# Patient Record
Sex: FEMALE | Race: WHITE | Employment: FULL TIME | URBAN - METROPOLITAN AREA
[De-identification: names, ages, dates, MRNs, and addresses within clinical notes are randomized per-mention and may not be internally consistent; named-entity substitution may affect disease eponyms.]

---

## 2018-06-25 ENCOUNTER — HOSPITAL ENCOUNTER (OUTPATIENT)
Dept: PHYSICAL THERAPY | Age: 63
Discharge: HOME | End: 2018-06-25
Payer: COMMERCIAL

## 2018-06-25 PROCEDURE — 97162 PT EVAL MOD COMPLEX 30 MIN: CPT

## 2018-06-25 PROCEDURE — 97140 MANUAL THERAPY 1/> REGIONS: CPT

## 2018-06-25 NOTE — PROGRESS NOTES
[facility] PHYSICAL THERAPY  [address] [place] [address] 51, [facility] 201,[facility], [facility] - Phone: [phone]  Fax: [phone] / [address] [facility]  Patient Name: Zach Kam : 1955   Medical   Diagnosis: Right shoulder pain [M25.511] Treatment Diagnosis: Right shoulder pain [M25.511]   Onset Date: ~3wks ago     Referral Source: Tank Laureano MD [facility]): 2018   Prior Hospitalization: See medical history Provider #: [id number]   Prior Level of Function: Pain free reaching    Comorbidities: Osteoporosis   Medications: Verified on Patient Summary List   The Plan of Care and following information is based on the information from the initial evaluation.   ===========================================================================================  Assessment / aleman information:  Zach Kam is a 60 y.o.  yo female with Dx of Right shoulder pain [M25.511]. She reports insidious onset of loss of ROM in her R shoulder ~3wks ago. She currently rates her pain as 8/10 at worst, 0/10 at best, primarily located at anterior aspect of her R shoulder. She complains of difficulty and increase pain with reaching up and reaching behind. Objective Findings:  Shoulder AROM: Flx: R = 137 deg, L = 167 deg, Scaption: R = 138 deg, L = 167 deg,   Ext: R = 47 deg, L = 64 deg,  ER: R = 38 deg, L = 74 deg,  IR: R = 38 cm measured from C7 to thumb behind the back, L = 15 cm measured from C7 to thumb behind the back, Manual Muscle Testing: All UE strength are WNL. Palpation: increased soft tissue tension noted at R post cuff and pec.   Pt instructed in HEP and will f/u in clinic for PT.  ===========================================================================================  Eval Complexity: History MEDIUM  Complexity : 1-2 comorbidities / personal factors will impact the outcome/ POC ;  Examination  MEDIUM Complexity : 3 Standardized tests and measures addressing body structure, function, activity limitation and / or participation in recreation ; Presentation MEDIUM Complexity : Evolving with changing characteristics ; Decision Making MEDIUM Complexity : FOTO score of 26-74; Overall Complexity MEDIUM  Problem List: pain affecting function, decrease ROM, decrease ADL/ functional abilitiies, decrease activity tolerance and decrease flexibility/ joint mobility   Treatment Plan may include any combination of the following: Therapeutic exercise, Therapeutic activities, Neuromuscular re-education, Physical agent/modality, Manual therapy, Patient education, Self Care training and Functional mobility training  Patient / Family readiness to learn indicated by: asking questions, trying to perform skills and interest  Persons(s) to be included in education: patient (P)  Barriers to Learning/Limitations: no  Measures taken: FOTO = 57%   Patient Goal (s): Decrease pain    Patient self reported health status: excellent  Rehabilitation Potential: good   Short Term Goals: To be accomplished in  1-2  weeks:  1. Independent with HEP. 2. Decrease max pain 25-50% to assist with reaching behind   Long Term Goals: To be accomplished in  3-4  weeks:  1. Decrease max pain 50-75% to assist with reaching behind  2. Increase FOTO score to 69% to show functional improvment  3. Increaese R IR behind the back ROM to <25cm to assist with ADLs. Frequency / Duration:   Patient to be seen  2-3  times per week for 3-4  weeks:  Patient / Caregiver education and instruction: self care and exercises    Therapist Signature: Ulises Woo DPT, OCS, SCS, CSCS Date: 9/29/3170   Certification Period: na Time: 10:50 AM   =================================================================================I certify that the above Physical Therapy Services are being furnished while the patient is under my care.   I agree with the treatment plan and certify that this therapy is necessary. Physician Signature:        Date:       Time:     Please sign and return to [facility] or you may fax the signed copy to [phone]. Thank you. no difficulties

## 2018-06-25 NOTE — PROGRESS NOTES
PHYSICAL THERAPY - DAILY TREATMENT NOTE    Patient Name: Kalie Foster        Date: 2018  : 1955   YES Patient  Verified  Visit #:     Insurance: Payor: Carmen Severance / Plan: [facility] PPO / Product Type: PPO /      In time: 10:25 Out time: 10:55   Total Treatment Time: 35     Medicare Time Tracking (below)   Total Timed Codes (min):  na 1:1 Treatment Time:  na     TREATMENT AREA =  Right shoulder pain [M25.511]    SUBJECTIVE  Pain Level (on 0 to 10 scale):    Medication Changes/New allergies or changes in medical history, any new surgeries or procedures? NO    If yes, update Summary List   Subjective Functional Status/Changes:  []  No changes reported     SEE IE          OBJECTIVE    10 min Manual Therapy: DTM Post Cuff, Pec, GH Mob, PROM/Stretch   Rationale:      decrease pain, increase ROM and increase tissue extensibility to improve patient's ability to reach     min Patient Education:  YES  Reviewed HEP   []  Progressed/Changed HEP based on: Other Objective/Functional Measures:    SEE IE     Post Treatment Pain Level (on 0 to 10) scale:       ASSESSMENT  Assessment/Changes in Function:     SEE IE     []  See Progress Note/Recertification   Patient will continue to benefit from skilled PT services to modify and progress therapeutic interventions, address functional mobility deficits, address ROM deficits, address strength deficits, analyze and address soft tissue restrictions, analyze and cue movement patterns, analyze and modify body mechanics/ergonomics and assess and modify postural abnormalities to attain remaining goals. Progress toward goals / Updated goals:         PLAN  []  Upgrade activities as tolerated YES Continue plan of care   []  Discharge due to :    []  Other:      Therapist: Daisha Lawson, PT, OCS, SCS, CSCS    Date: 2018 Time: 10:49 AM       No future appointments.

## 2018-06-27 ENCOUNTER — HOSPITAL ENCOUNTER (OUTPATIENT)
Dept: PHYSICAL THERAPY | Age: 63
Discharge: HOME | End: 2018-06-27
Payer: COMMERCIAL

## 2018-06-27 PROCEDURE — 97140 MANUAL THERAPY 1/> REGIONS: CPT

## 2018-06-27 PROCEDURE — 97110 THERAPEUTIC EXERCISES: CPT

## 2018-06-27 NOTE — PROGRESS NOTES
PHYSICAL THERAPY - DAILY TREATMENT NOTE    Patient Name: Beatriz Hughes        Date: 2018  : 1955   YES Patient  Verified  Visit #:   2   of   12  Insurance: Payor: BLUE CROSS / Plan: [facility] PPO / Product Type: PPO /      In time: 305 Out time: 345   Total Treatment Time: 40     Medicare Time Tracking (below)   Total Timed Codes (min):  30 1:1 Treatment Time:  30     TREATMENT AREA =  Right shoulder pain [M25.511]    SUBJECTIVE  Pain Level (on 0 to 10 scale):  0  / 10   Medication Changes/New allergies or changes in medical history, any new surgeries or procedures? NO    If yes, update Summary List   Subjective Functional Status/Changes:  []  No changes reported     Pt is reporting pain with certain movements - behind the back and reaching out to the side. OBJECTIVE  Modalities Rationale:     decrease inflammation and decrease pain to improve patient's ability to perform pain free ADLs. min [] Estim, type/location:                                      []  att     []  unatt     []  w/US     []  w/ice    []  w/heat    min []  Mechanical Traction: type/lbs                   []  pro   []  sup   []  int   []  cont    []  before manual    []  after manual    min []  Ultrasound, settings/location:      min []  Iontophoresis w/ dexamethasone, location:                                               []  take home patch       []  in clinic   10 min [x]  Ice     []  Heat    location/position: Supine, (R) shoulder. min []  Vasopneumatic Device, press/temp:     min []  Other:    [x] Skin assessment post-treatment (if applicable):    [x]  intact    []  redness- no adverse reaction     []redness  adverse reaction:        20 min Therapeutic Exercise:  [x]  See flow sheet   Rationale:      increase ROM, increase strength and improve coordination to improve the patients ability to perform pain free ADLs. 10 Min Manual Therapy: (R) shoulder PROM.   TPR (R) rhomboids, periscapular mms.    Rationale:      decrease pain, increase ROM, increase tissue extensibility and decrease trigger points to improve patient's ability to perform pain free ADLs. min Patient Education:  YES  Reviewed HEP   []  Progressed/Changed HEP based on: Other Objective/Functional Measures: Added multiple exercises to improve (R) shoulder mobility/strength for ADLs. Post Treatment Pain Level (on 0 to 10) scale:   0   / 10     ASSESSMENT  Assessment/Changes in Function:     Updated HEP exercises and instructed pt to perform 2x daily. []  See Progress Note/Recertification   Patient will continue to benefit from skilled PT services to modify and progress therapeutic interventions, address functional mobility deficits, address ROM deficits, address strength deficits, analyze and address soft tissue restrictions, analyze and cue movement patterns, analyze and modify body mechanics/ergonomics and assess and modify postural abnormalities to attain remaining goals. Progress toward goals / Updated goals:    Initiated therex.       PLAN  [x]  Upgrade activities as tolerated YES Continue plan of care   []  Discharge due to :    []  Other:      Therapist: Howie Sherman PTA    Date: 6/27/2018 Time: 3:08 PM     Future Appointments  Date Time Provider Dirk Paz   7/2/2018 4:30 PM Howie Sherman PTA [facility]   7/5/2018 4:00  [facility]   7/10/2018 4:00  [facility]   7/12/2018 4:00  [facility]   7/17/2018 4:00 PM Barbara Lemus PT [facility]   7/19/2018 10:00 AM Gustavo Alas, PT [id number]

## 2018-07-02 ENCOUNTER — HOSPITAL ENCOUNTER (OUTPATIENT)
Dept: PHYSICAL THERAPY | Age: 63
Discharge: HOME | End: 2018-07-02
Payer: COMMERCIAL

## 2018-07-02 PROCEDURE — 97140 MANUAL THERAPY 1/> REGIONS: CPT

## 2018-07-02 PROCEDURE — 97110 THERAPEUTIC EXERCISES: CPT

## 2018-07-02 NOTE — PROGRESS NOTES
PHYSICAL THERAPY - DAILY TREATMENT NOTE    Patient Name: Pao Strickland        Date: 2018  : 1955   YES Patient  Verified  Visit #:   3   of   12  Insurance: Payor: Adry Cano / Plan: [facility] PPO / Product Type: PPO /      In time: 440 Out time: 525   Total Treatment Time: 45     Medicare Time Tracking (below)   Total Timed Codes (min):  35 1:1 Treatment Time:  25     TREATMENT AREA =  Right shoulder pain [M25.511]    SUBJECTIVE  Pain Level (on 0 to 10 scale):  0  / 10   Medication Changes/New allergies or changes in medical history, any new surgeries or procedures? NO    If yes, update Summary List   Subjective Functional Status/Changes:  []  No changes reported     No new complaints. OBJECTIVE  Modalities Rationale:     decrease inflammation and decrease pain  to improve patient's ability to perform pain free ADLs. min [] Estim, type/location:                                      []  att     []  unatt     []  w/US     []  w/ice    []  w/heat    min []  Mechanical Traction: type/lbs                   []  pro   []  sup   []  int   []  cont    []  before manual    []  after manual    min []  Ultrasound, settings/location:      min []  Iontophoresis w/ dexamethasone, location:                                               []  take home patch       []  in clinic   10 min [x]  Ice     []  Heat    location/position: Supine, (R) shoulder. min []  Vasopneumatic Device, press/temp:     min []  Other:    [x] Skin assessment post-treatment (if applicable):    [x]  intact    []  redness- no adverse reaction     []redness  adverse reaction:        20 (10) min Therapeutic Exercise:  [x]  See flow sheet   Rationale:      increase ROM, increase strength and improve coordination to improve the patients ability to perform pain free ADLs. 15 Min Manual Therapy: (R) shoulder PROM. TPR (R) periscapular mms.     Rationale:      decrease pain, increase ROM, increase tissue extensibility and decrease trigger points to improve patient's ability to perform pain free ADLs. min Patient Education:  YES  Reviewed HEP   []  Progressed/Changed HEP based on: Other Objective/Functional Measures: Therex per flow sheet. Post Treatment Pain Level (on 0 to 10) scale:   0   / 10     ASSESSMENT  Assessment/Changes in Function:     Soreness in posterior cuff and pec minor. Range limited by pain. []  See Progress Note/Recertification   Patient will continue to benefit from skilled PT services to modify and progress therapeutic interventions, address functional mobility deficits, address ROM deficits, address strength deficits, analyze and address soft tissue restrictions, analyze and cue movement patterns, analyze and modify body mechanics/ergonomics and assess and modify postural abnormalities to attain remaining goals. Progress toward goals / Updated goals:    No change in progress toward LTG's with today's session.       PLAN  [x]  Upgrade activities as tolerated YES Continue plan of care   []  Discharge due to :    []  Other:      Therapist: Howie Sherman PTA    Date: 7/2/2018 Time: 4:58 PM     Future Appointments  Date Time Provider Dirk Paz   7/5/2018 2:00 PM Blanca Delgado, PT [facility]   7/10/2018 4:00 PM [facility]   7/12/2018 4:00 PM [facility]   7/17/2018 4:00 PM Barbara Lemus, PT [facility]   7/19/2018 10:00 AM Gustavo Alas, PT [facility]

## 2018-07-05 ENCOUNTER — HOSPITAL ENCOUNTER (OUTPATIENT)
Dept: PHYSICAL THERAPY | Age: 63
End: 2018-07-05
Payer: COMMERCIAL

## 2018-07-10 ENCOUNTER — HOSPITAL ENCOUNTER (OUTPATIENT)
Dept: PHYSICAL THERAPY | Age: 63
Discharge: HOME | End: 2018-07-10
Payer: COMMERCIAL

## 2018-07-10 PROCEDURE — 97110 THERAPEUTIC EXERCISES: CPT

## 2018-07-10 PROCEDURE — 97140 MANUAL THERAPY 1/> REGIONS: CPT

## 2018-07-10 NOTE — PROGRESS NOTES
PHYSICAL THERAPY - DAILY TREATMENT NOTE    Patient Name: Americo Marquez        Date: 7/10/2018  : 1955   YES Patient  Verified  Visit #:     Insurance: Payor: Ashley Jones / Plan: [facility] PPO / Product Type: PPO /      In time: 3:32 Out time: 4:30   Total Treatment Time: 58     Medicare Time Tracking (below)   Total Timed Codes (min):  48 1:1 Treatment Time:  40     TREATMENT AREA =  Right shoulder pain [M25.511]    SUBJECTIVE  Pain Level (on 0 to 10 scale): 0 / 10   Medication Changes/New allergies or changes in medical history, any new surgeries or procedures? NO    If yes, update Summary List   Subjective Functional Status/Changes:  []  No changes reported     Patient reports good compliance with HEP. States that she has bruises where therapist did manual from previous PT session; patient did not want to file formal complaint. OBJECTIVE  Modalities Rationale:     decrease inflammation and decrease pain  to improve patient's ability to perform pain free ADLs.     min [] Estim, type/location:                                      []  att     []  unatt     []  w/US     []  w/ice    []  w/heat    min []  Mechanical Traction: type/lbs                   []  pro   []  sup   []  int   []  cont    []  before manual    []  after manual    min []  Ultrasound, settings/location:      min []  Iontophoresis w/ dexamethasone, location:                                               []  take home patch       []  in clinic   10 min [x]  Ice     []  Heat    location/position: Supine with bolster to R shoulder post-session    min []  Vasopneumatic Device, press/temp:     min []  Other:    [x] Skin assessment post-treatment (if applicable):    [x]  intact    []  redness- no adverse reaction     []redness  adverse reaction:        25/33 min Therapeutic Exercise:  [x]  See flow sheet   Rationale:      increase ROM, increase strength and improve coordination to improve the patients ability to perform pain free ADLs. 15 Min Manual Therapy: Gentle R shoulder PROM in all direction; gentle STM to R periscapular mms   Rationale:      decrease pain, increase ROM, increase tissue extensibility and decrease trigger points to improve patient's ability to perform pain free ADLs. min Patient Education:  YES  Reviewed HEP   []  Progressed/Changed HEP based on: Other Objective/Functional Measures:    1:1 TE = 25'  Increased hold time for cane ext/IR stretch  Increased resistance for TB rows/ext  Presents significant tenderness along infraspinatus and rhomboids during manual therapy; presents with significant restriction with IR and ER     Post Treatment Pain Level (on 0 to 10) scale:   0   / 10     ASSESSMENT  Assessment/Changes in Function:     Demonstrates good tolerance with exercises; able to improve ROM and decrease R shoulder discomfort with more repetitions  Due to patient's bruising, performed gentle manual therapy; patient denies sharp pain during manual therapy. []  See Progress Note/Recertification   Patient will continue to benefit from skilled PT services to modify and progress therapeutic interventions, address functional mobility deficits, address ROM deficits, address strength deficits, analyze and address soft tissue restrictions, analyze and cue movement patterns, analyze and modify body mechanics/ergonomics and assess and modify postural abnormalities to attain remaining goals.    Progress toward goals / Updated goals:    Good progress towards STG #1   Slow, gradual progress towards STG #2       PLAN  [x]  Upgrade activities as tolerated YES Continue plan of care   []  Discharge due to :    []  Other:      Therapist: VANE Owens    Date: 7/10/2018 Time: 4:50 PM     Future Appointments  Date Time Provider Dirk Paz   7/10/2018 3:30 PM [facility]   7/12/2018 4:00 PM [facility]   7/17/2018 4:00 PM Sher Kumar PT [facility] 7/19/2018 10:00 AM Amilcar Diallo, PT [facility]

## 2018-07-12 ENCOUNTER — HOSPITAL ENCOUNTER (OUTPATIENT)
Dept: PHYSICAL THERAPY | Age: 63
Discharge: HOME | End: 2018-07-12
Payer: COMMERCIAL

## 2018-07-12 PROCEDURE — 97140 MANUAL THERAPY 1/> REGIONS: CPT

## 2018-07-12 PROCEDURE — 97110 THERAPEUTIC EXERCISES: CPT

## 2018-07-12 NOTE — PROGRESS NOTES
PHYSICAL THERAPY - DAILY TREATMENT NOTE    Patient Name: Raffy Garsia        Date: 2018  : 1955   YES Patient  Verified  Visit #:     Insurance: Payor: Evelyn Cote / Plan: [facility] PPO / Product Type: PPO /      In time: 4:03 Out time: 4:36   Total Treatment Time: 33     Medicare Time Tracking (below)   Total Timed Codes (min):  33 1:1 Treatment Time:  33     TREATMENT AREA =  Right shoulder pain [M25.511]    SUBJECTIVE  Pain Level (on 0 to 10 scale): 0 / 10   Medication Changes/New allergies or changes in medical history, any new surgeries or procedures? NO    If yes, update Summary List   Subjective Functional Status/Changes:  []  No changes reported     \"I still have difficulty with putting my hair up into a pony tail. But my daughter noticed that I am moving my arm better\"       OBJECTIVE  Modalities Rationale:     decrease inflammation and decrease pain  to improve patient's ability to perform pain free ADLs. min [] Estim, type/location:                                      []  att     []  unatt     []  w/US     []  w/ice    []  w/heat    min []  Mechanical Traction: type/lbs                   []  pro   []  sup   []  int   []  cont    []  before manual    []  after manual    min []  Ultrasound, settings/location:      min []  Iontophoresis w/ dexamethasone, location:                                               []  take home patch       []  in clinic   PD min [x]  Ice     []  Heat    location/position: Supine with bolster to R shoulder post-session    min []  Vasopneumatic Device, press/temp:     min []  Other:    [x] Skin assessment post-treatment (if applicable):    [x]  intact    []  redness- no adverse reaction     []redness  adverse reaction:        23 min Therapeutic Exercise:  [x]  See flow sheet   Rationale:      increase ROM, increase strength and improve coordination to improve the patients ability to perform pain free ADLs.       [address] Therapy: Gentle R shoulder PROM in all direction; gentle STM to R periscapular mms   Rationale:      decrease pain, increase ROM, increase tissue extensibility and decrease trigger points to improve patient's ability to perform pain free ADLs. min Patient Education:  YES  Reviewed HEP   []  Progressed/Changed HEP based on: Other Objective/Functional Measures:    1:1 TE = 23'  TE per flowsheet  Noted decrease tenderness along rhomboids, however continues to have significant tenderness along infraspinatus     Post Treatment Pain Level (on 0 to 10) scale:   0   / 10     ASSESSMENT  Assessment/Changes in Function:     Demonstrated good tolerance with TE and MT; noted no increase R shoulder pain during exercises     []  See Progress Note/Recertification   Patient will continue to benefit from skilled PT services to modify and progress therapeutic interventions, address functional mobility deficits, address ROM deficits, address strength deficits, analyze and address soft tissue restrictions, analyze and cue movement patterns, analyze and modify body mechanics/ergonomics and assess and modify postural abnormalities to attain remaining goals. Progress toward goals / Updated goals:     All STGs MET; progress towards LTGs       PLAN  [x]  Upgrade activities as tolerated YES Continue plan of care   []  Discharge due to :    []  Other:      Therapist: VANE Yip    Date: 7/12/2018 Time: 5:54 PM     Future Appointments  Date Time Provider Dirk Paz   7/12/2018 4:00 PM Jack Loyd [facility]   7/17/2018 4:00 PM Pete King, PT [facility]   7/19/2018 4:00 PM Jcak Loyd [facility]

## 2018-07-17 ENCOUNTER — HOSPITAL ENCOUNTER (OUTPATIENT)
Dept: PHYSICAL THERAPY | Age: 63
Discharge: HOME | End: 2018-07-17
Payer: COMMERCIAL

## 2018-07-17 PROCEDURE — 97110 THERAPEUTIC EXERCISES: CPT

## 2018-07-17 PROCEDURE — 97140 MANUAL THERAPY 1/> REGIONS: CPT

## 2018-07-17 NOTE — PROGRESS NOTES
PHYSICAL THERAPY - DAILY TREATMENT NOTE    Patient Name: Alexander Ayala        Date: 2018  : 1955   YES Patient  Verified  Visit #:     Insurance: Payor: Aida Babcock / Plan: [facility] PPO / Product Type: PPO /      In time: 4:00 Out time: 4:55   Total Treatment Time: 55     Medicare Time Tracking (below)   Total Timed Codes (min):  na 1:1 Treatment Time:  na     TREATMENT AREA =  Right shoulder pain [M25.511]    SUBJECTIVE  Pain Level (on 0 to 10 scale):  0  / 10   Medication Changes/New allergies or changes in medical history, any new surgeries or procedures?     NO    If yes, update Summary List   Subjective Functional Status/Changes:  []  No changes reported     No pain unless I move it in a wrong way          OBJECTIVE  Modalities Rationale:     decrease inflammation and decrease pain to improve patient's ability to reach/lift   min [] Estim, type/location:                                      []  att     []  unatt     []  w/US     []  w/ice    []  w/heat    min []  Mechanical Traction: type/lbs                   []  pro   []  sup   []  int   []  cont    []  before manual    []  after manual    min []  Ultrasound, settings/location:      min []  Iontophoresis w/ dexamethasone, location:                                               []  take home patch       []  in clinic   10 min []  Ice     []  Heat    location/position:     min []  Vasopneumatic Device, press/temp:     min []  Other:    [x] Skin assessment post-treatment (if applicable):    [x]  intact    []  redness- no adverse reaction     []redness  adverse reaction:        35 min Therapeutic Exercise:  [x]  See flow sheet   Rationale:      increase ROM, increase strength and improve coordination to improve the patients ability to reach/lift   10 min Manual Therapy: DTM Post Cuff, Pec, GH Mob, PROM/Stretch   Rationale:      decrease pain, increase ROM and increase tissue extensibility to improve patient's ability to reach     min Patient Education:  YES  Reviewed HEP   []  Progressed/Changed HEP based on: Other Objective/Functional Measures:    FOTO = 55  GROC = +5     Post Treatment Pain Level (on 0 to 10) scale:   0  / 10     ASSESSMENT  Assessment/Changes in Function:     Improved Flx ROM noted   Cont to be tight into IR     []  See Progress Note/Recertification   Patient will continue to benefit from skilled PT services to modify and progress therapeutic interventions, address functional mobility deficits, address ROM deficits, address strength deficits, analyze and address soft tissue restrictions, analyze and cue movement patterns, analyze and modify body mechanics/ergonomics and assess and modify postural abnormalities to attain remaining goals.    Progress toward goals / Updated goals:    Slowly progressing with ROM      PLAN  []  Upgrade activities as tolerated YES Continue plan of care   []  Discharge due to :    []  Other:      Therapist: Jacky Alatorre, PT, OCS, SCS, CSCS    Date: 7/17/2018 Time: 10:23 AM       Future Appointments  Date Time Provider Dirk Paz   7/17/2018 4:00 PM Judah Ramirez, PT [facility]   7/19/2018 11:00 AM Madina Rudd, PT [facility]

## 2018-07-19 ENCOUNTER — HOSPITAL ENCOUNTER (OUTPATIENT)
Dept: PHYSICAL THERAPY | Age: 63
Discharge: HOME | End: 2018-07-19
Payer: COMMERCIAL

## 2018-07-19 PROCEDURE — 97140 MANUAL THERAPY 1/> REGIONS: CPT

## 2018-07-19 NOTE — PROGRESS NOTES
PHYSICAL THERAPY - DAILY TREATMENT NOTE    Patient Name: Leroy Zambrano        Date: 2018  : 1955   YES Patient  Verified  Visit #:     Insurance: Payor: Heriberto Durán / Plan: [facility] PPO / Product Type: PPO /      In time: 1055 Out time: 1149   Total Treatment Time: 54     Medicare Time Tracking (below)   Total Timed Codes (min):  44 1:1 Treatment Time:  12     TREATMENT AREA =  Right shoulder pain [M25.511]    SUBJECTIVE  Pain Level (on 0 to 10 scale):  0  / 10   Medication Changes/New allergies or changes in medical history, any new surgeries or procedures? NO    If yes, update Summary List   Subjective Functional Status/Changes:  []  No changes reported     Patient reports her shoulder only hurts with certain movements, particularly when reaching behind her back. Patient denies complications since her LV. OBJECTIVE  Modalities Rationale:     decrease inflammation and decrease pain to improve patient's ability to perform self care activities. min [] Estim, type/location:                                      []  att     []  unatt     []  w/US     []  w/ice    []  w/heat    min []  Mechanical Traction: type/lbs                   []  pro   []  sup   []  int   []  cont    []  before manual    []  after manual    min []  Ultrasound, settings/location:      min []  Iontophoresis w/ dexamethasone, location:                                               []  take home patch       []  in clinic   10 min [x]  Ice     []  Heat    location/position: To R shoulder in supine with bolster    min []  Vasopneumatic Device, press/temp:     min []  Other:    [x] Skin assessment post-treatment (if applicable):    [x]  intact    []  redness- no adverse reaction     []redness  adverse reaction:        32 min Therapeutic Exercise:  [x]  See flow sheet   Rationale:      increase ROM and increase strength to improve the patients ability to perform reaching activities.       12 min Manual Therapy: STM to R posterior RC, R UT, R pec minor; R shoulder PROM, R GH joint mobs   Rationale:      decrease pain, increase ROM, increase tissue extensibility and decrease trigger points to improve patient's ability to perform carrying activities. min Patient Education:  YES  Reviewed HEP   []  Progressed/Changed HEP based on: Other Objective/Functional Measures:    1:1 TE 0'  Patient limited with R shoulder IR PROM during MT, good tolerance to [facility] treatment  Added IR stretch with strap and finger ladder this session in order to improve FIR and OH AROM respectively. Post Treatment Pain Level (on 0 to 10) scale:   0  / 10     ASSESSMENT  Assessment/Changes in Function:     Patient tolerated session well, denied increased pain. Patient educated on performing IR stretch with towel after a hot shower at home in order to improve FIR AROM and reduce pain with this motion. Patient acknowledged understanding. []  See Progress Note/Recertification   Patient will continue to benefit from skilled PT services to modify and progress therapeutic interventions, address functional mobility deficits, address ROM deficits, address strength deficits, analyze and address soft tissue restrictions, analyze and cue movement patterns, analyze and modify body mechanics/ergonomics, assess and modify postural abnormalities and address imbalance/dizziness to attain remaining goals.    Progress toward goals / Updated goals:    Slow progress with LTG#3 per performance in today's session     PLAN  [x]  Upgrade activities as tolerated YES Continue plan of care   []  Discharge due to :    []  Other:      Therapist: Jihan Hernandez PT    Date: 7/19/2018 Time: 12:44 PM     Future Appointments  Date Time Provider Dirk Paz   7/24/2018 4:00  [facility]   7/26/2018 4:00  [facility]   7/31/2018 4:00  [facility]   8/2/2018 3:30  [facility] 8/7/2018 4:00  [facility]   8/9/2018 4:00  [facility]

## 2018-07-24 ENCOUNTER — HOSPITAL ENCOUNTER (OUTPATIENT)
Dept: PHYSICAL THERAPY | Age: 63
Discharge: HOME | End: 2018-07-24
Payer: COMMERCIAL

## 2018-07-24 PROCEDURE — 97140 MANUAL THERAPY 1/> REGIONS: CPT

## 2018-07-24 PROCEDURE — 97110 THERAPEUTIC EXERCISES: CPT

## 2018-07-24 NOTE — PROGRESS NOTES
PHYSICAL THERAPY - DAILY TREATMENT NOTE    Patient Name: Nora Gross        Date: 2018  : 1955   YES Patient  Verified  Visit #:     Insurance: Payor: Claudeen Lao / Plan: [facility] PPO / Product Type: PPO /      In time: 4:00 Out time: 5:00   Total Treatment Time: 60     Medicare Time Tracking (below)   Total Timed Codes (min):  50 1:1 Treatment Time:  30     TREATMENT AREA =  Right shoulder pain [M25.511]    SUBJECTIVE  Pain Level (on 0 to 10 scale): 0 / 10   Medication Changes/New allergies or changes in medical history, any new surgeries or procedures? NO    If yes, update Summary List   Subjective Functional Status/Changes:  []  No changes reported     Patient currently reports no pain in the R shoulder, however states when she \"moves it the wrong way\" (reaching behind her back and behind her head)    SEE PN     OBJECTIVE  Modalities Rationale:     decrease inflammation and decrease pain  to improve patient's ability to perform pain free ADLs.     min [] Estim, type/location:                                      []  att     []  unatt     []  w/US     []  w/ice    []  w/heat    min []  Mechanical Traction: type/lbs                   []  pro   []  sup   []  int   []  cont    []  before manual    []  after manual    min []  Ultrasound, settings/location:      min []  Iontophoresis w/ dexamethasone, location:                                               []  take home patch       []  in clinic   10 min [x]  Ice     []  Heat    location/position: Supine with bolster to R shoulder post-session    min []  Vasopneumatic Device, press/temp:     min []  Other:    [x] Skin assessment post-treatment (if applicable):    [x]  intact    []  redness- no adverse reaction     []redness  adverse reaction:        20/40 min Therapeutic Exercise:  [x]  See flow sheet   Rationale:      increase ROM, increase strength and improve coordination to improve the patients ability to perform [place] activities. 10 Min Manual Therapy: Gentle R shoulder PROM in all direction; gentle STM to R periscapular mms   Rationale:      decrease pain, increase ROM, increase tissue extensibility and decrease trigger points to improve patient's ability to perform pain free ADLs. min Patient Education:  YES  Reviewed HEP   []  Progressed/Changed HEP based on: Other Objective/Functional Measures:    1:1 TE + MT = 30'  SEE PN     Post Treatment Pain Level (on 0 to 10) scale:   0   / 10     ASSESSMENT  Assessment/Changes in Function:     SEE PN      []  See Progress Note/Recertification   Patient will continue to benefit from skilled PT services to modify and progress therapeutic interventions, address functional mobility deficits, address ROM deficits, address strength deficits, analyze and address soft tissue restrictions, analyze and cue movement patterns, analyze and modify body mechanics/ergonomics and assess and modify postural abnormalities to attain remaining goals.    Progress toward goals / Updated goals:    SEE PN       PLAN  [x]  Upgrade activities as tolerated YES Continue plan of care   []  Discharge due to :    []  Other:      Therapist: Ulanda Bamberger, LPTA    Date: 7/24/2018 Time: 6:19 PM     Future Appointments  Date Time Provider Dirk Paz   7/24/2018 4:00 PM niya Bamberger [facility]   7/26/2018 4:00 PM Fadia Bamberger [facility]   7/31/2018 4:00 PM Fadia Bamberger [facility]   8/2/2018 3:30 PM niya Bamberger [facility]   8/7/2018 4:00 PM Fadia Bamberger [facility]   8/9/2018 4:00 PM niya Bamberger [facility]

## 2018-07-24 NOTE — PROGRESS NOTES
[address]  [facility] PHYSICAL THERAPY  [address], [place] [address],[facility], [address] [facility] - Phone: [phone]  Fax: [phone]  PROGRESS NOTE  Patient Name: Amparo Cole : 1955   Treatment/Medical Diagnosis: Right shoulder pain [M25.511]   Referral Source: Kar Marroquin MD     Date of Initial Visit: 18 Attended Visits: 8 Missed Visits: 0     SUMMARY OF TREATMENT  Therapeutic exercises, manual therapy, patient education, postural education, and home exercise program to improve R shoulder ROM, strength, flexibility, and endurance; modalities for pain control. CURRENT STATUS  Mrs. Beth Villa has made steady progress towards her PT goals for her R shoulder. States that she has made 50% overall improvement in her R shoulder symptoms since beginning physical therapy. Demonstrates improvement with flexion 145 deg (IE: 176 deg) and scaption 142 deg (IE: 138 deg), however continues to note end range pain with movements. Continues to be restricted with IR, noting no change since initial evaluation. Patient notes a decrease in FOTO to 46/100 (IE: 57/100), however +5 on GROC (global rating of change) denoting functional improvement. Goal/Measure of Progress Goal Met? 1. Decrease max pain 50-75% to assist with reaching behind   Status at last Eval: 8/10 Current Status: 8/10 No change   2. Increase FOTO score to 69% to show functional improvment   Status at last Eval: 57/100 Current Status: 46/100 *may not be a true indicator of progress* no   3. Increaese R IR behind the back ROM to <25cm to assist with ADLs. Status at last Eval: 38 cm from C7 Current Status: 38 cm from C7 No change     New Goals to be achieved in __4-6__  weeks:  1. Continue with goals listed above    RECOMMENDATIONS  Patient to continue for 2-3x/wk to address remaining functional limitations. If you have any questions/comments please contact us directly at [phone].    Thank you for allowing us to assist in the care of your patient. Therapist Signature: VANE Edwards/Irineo Ryan, PT, OCS, SCS, CSCS Date: 7/24/2018     Time: 6:53 PM   NOTE TO PHYSICIAN:  PLEASE COMPLETE THE ORDERS BELOW AND FAX TO   [facility] Physical Therapy: [phone]  If you are unable to process this request in 24 hours please contact our office: [phone]    ___ I have read the above report and request that my patient continue as recommended.   ___ I have read the above report and request that my patient continue therapy with the following changes/special instructions:_________________________________________________________   ___ I have read the above report and request that my patient be discharged from therapy.      Physician Signature:        Date:       Time:

## 2018-07-26 ENCOUNTER — HOSPITAL ENCOUNTER (OUTPATIENT)
Dept: PHYSICAL THERAPY | Age: 63
Discharge: HOME | End: 2018-07-26
Payer: COMMERCIAL

## 2018-07-26 PROCEDURE — 97110 THERAPEUTIC EXERCISES: CPT

## 2018-07-26 PROCEDURE — 97140 MANUAL THERAPY 1/> REGIONS: CPT

## 2018-07-26 NOTE — PROGRESS NOTES
PHYSICAL THERAPY - DAILY TREATMENT NOTE    Patient Name: Raffy Garsia        Date: 2018  : 1955   YES Patient  Verified  Visit #:     Insurance: Payor: Evelyn Cote / Plan: [facility] PPO / Product Type: PPO /      In time: 4:03 Out time: 5:05   Total Treatment Time: 62     Medicare Time Tracking (below)   Total Timed Codes (min):  52 1:1 Treatment Time:  30     TREATMENT AREA =  Right shoulder pain [M25.511]    SUBJECTIVE  Pain Level (on 0 to 10 scale): 0 / 10   Medication Changes/New allergies or changes in medical history, any new surgeries or procedures? NO    If yes, update Summary List   Subjective Functional Status/Changes:  []  No changes reported     Patient reports feeling more discomfort than pain when trying to reach behind her. OBJECTIVE  Modalities Rationale:     decrease inflammation and decrease pain  to improve patient's ability to perform pain free ADLs. min [] Estim, type/location:                                      []  att     []  unatt     []  w/US     []  w/ice    []  w/heat    min []  Mechanical Traction: type/lbs                   []  pro   []  sup   []  int   []  cont    []  before manual    []  after manual    min []  Ultrasound, settings/location:      min []  Iontophoresis w/ dexamethasone, location:                                               []  take home patch       []  in clinic   10 min [x]  Ice     []  Heat    location/position: Supine with bolster to R shoulder post-session    min []  Vasopneumatic Device, press/temp:     min []  Other:    [x] Skin assessment post-treatment (if applicable):    [x]  intact    []  redness- no adverse reaction     []redness  adverse reaction:        20/42 min Therapeutic Exercise:  [x]  See flow sheet   Rationale:      increase ROM, increase strength and improve coordination to improve the patients ability to perform [place] activities.       10 Min Manual Therapy: Gentle R shoulder PROM in all direction; gentle STM to R periscapular mms   Rationale:      decrease pain, increase ROM, increase tissue extensibility and decrease trigger points to improve patient's ability to perform pain free ADLs. min Patient Education:  YES  Reviewed HEP   []  Progressed/Changed HEP based on: Other Objective/Functional Measures: Added TB IR/ER and wall slides with FR     Post Treatment Pain Level (on 0 to 10) scale:   0   / 10     ASSESSMENT  Assessment/Changes in Function:     Demonstrated good tolerance with progression of exercises. Req'd cues for gentle AAROM for IR; noted decrease c/o initial discomfort     []  See Progress Note/Recertification   Patient will continue to benefit from skilled PT services to modify and progress therapeutic interventions, address functional mobility deficits, address ROM deficits, address strength deficits, analyze and address soft tissue restrictions, analyze and cue movement patterns, analyze and modify body mechanics/ergonomics and assess and modify postural abnormalities to attain remaining goals.    Progress toward goals / Updated goals:    Slow, gradual progress towards newly established goals       PLAN  [x]  Upgrade activities as tolerated YES Continue plan of care   []  Discharge due to :    []  Other:      Therapist: VANE Patel    Date: 7/26/2018 Time: 7:11 PM     Future Appointments  Date Time Provider Dirk Paz   7/26/2018 4:00 PM Ayad Cadet [facility]   7/31/2018 4:00 PM Ayad Cadet [facility]   8/2/2018 3:30 PM Ayad Cadet [facility]   8/7/2018 4:00 PM Ayad Cadet [facility]   8/9/2018 4:00 PM Ayad Cadet [facility]

## 2018-07-31 ENCOUNTER — HOSPITAL ENCOUNTER (OUTPATIENT)
Dept: PHYSICAL THERAPY | Age: 63
Discharge: HOME | End: 2018-07-31
Payer: COMMERCIAL

## 2018-07-31 PROCEDURE — 97110 THERAPEUTIC EXERCISES: CPT

## 2018-07-31 PROCEDURE — 97140 MANUAL THERAPY 1/> REGIONS: CPT

## 2018-07-31 NOTE — PROGRESS NOTES
PHYSICAL THERAPY - DAILY TREATMENT NOTE Patient Name: Brayan Gonsalez        Date: 2018 : 1955   YES Patient  Verified Visit #:   10   of   12  Insurance: Payor: BLUE CROSS / Plan: [facility] PPO / Product Type: PPO / In time: 4:05 Out time: 5:15 Total Treatment Time: 70 Medicare Time Tracking (below) Total Timed Codes (min): 60 1:1 Treatment Time:  30 TREATMENT AREA =  Right shoulder pain [M25.511] SUBJECTIVE Pain Level (on 0 to 10 scale): 0 / 10 Medication Changes/New allergies or changes in medical history, any new surgeries or procedures? NO    If yes, update Summary List  
Subjective Functional Status/Changes:  []  No changes reported Patient reports she has not been able to work out in the pool due to the weather; states that she is frustrated due to lack of progress in R shoulder ROM ( specifically flexion and IR) OBJECTIVE Modalities Rationale:     decrease inflammation and decrease pain  to improve patient's ability to perform pain free ADLs. min [] Estim, type/location:    
                                 []  att     []  unatt     []  w/US     []  w/ice    []  w/heat  
 min []  Mechanical Traction: type/lbs   
               []  pro   []  sup   []  int   []  cont    []  before manual    []  after manual  
 min []  Ultrasound, settings/location:    
 min []  Iontophoresis w/ dexamethasone, location:   
                                           []  take home patch       []  in clinic  
10 min [x]  Ice     []  Heat    location/position: Supine with bolster to R shoulder post-session  
 min []  Vasopneumatic Device, press/temp:   
 min []  Other:   
[x] Skin assessment post-treatment (if applicable):   
[x]  intact    []  redness- no adverse reaction    
[]redness  adverse reaction:     
 
15/50 min Therapeutic Exercise:  [x]  See flow sheet Rationale:      increase ROM, increase strength and improve coordination to improve the patients ability to perform [address] activities. 15 Min Manual Therapy: Gentle R shoulder PROM in all direction; gentle STM to R periscapular mms, anterior delt, biceps, pec minor Rationale:      decrease pain, increase ROM, increase tissue extensibility and decrease trigger points to improve patient's ability to perform pain free ADLs. min Patient Education:  YES  Reviewed HEP []  Progressed/Changed HEP based on: Other Objective/Functional Measures: 
 
1:1 TE + MT = 30' Presented with significant tightness and trigger points in pec minor Added pec stretch over TR and low doorway stretch Increased repetitions for IR stretch with strap Post Treatment Pain Level (on 0 to 10) scale:   0  / 10 ASSESSMENT Assessment/Changes in Function:  
 
Noted discomfort during progression of exercises, however denies sharp pain during exercises. Continues to have good relief of discomfort following ice. []  See Progress Note/Recertification Patient will continue to benefit from skilled PT services to modify and progress therapeutic interventions, address functional mobility deficits, address ROM deficits, address strength deficits, analyze and address soft tissue restrictions, analyze and cue movement patterns, analyze and modify body mechanics/ergonomics and assess and modify postural abnormalities to attain remaining goals. Progress toward goals / Updated goals: 
 
Slow, gradual progress towards ROM goals PLAN [x]  Upgrade activities as tolerated YES Continue plan of care  
[]  Discharge due to :   
[]  Other:   
 
Therapist: VANE Phillips Date: 7/31/2018 Time: 6:33 PM  
 
Future Appointments Date Time Provider Dirk Paz 8/2/2018 3:30 PM [address] [facility]  
8/7/2018 4:00 PM Cassandra Pretty [address] [place] [address]  
8/9/2018 4:00 PM Cassandra Pretty [address] [place] [address]

## 2018-08-02 ENCOUNTER — HOSPITAL ENCOUNTER (OUTPATIENT)
Dept: PHYSICAL THERAPY | Age: 63
Discharge: HOME | End: 2018-08-02
Payer: COMMERCIAL

## 2018-08-02 PROCEDURE — 97140 MANUAL THERAPY 1/> REGIONS: CPT

## 2018-08-02 PROCEDURE — 97110 THERAPEUTIC EXERCISES: CPT

## 2018-08-02 NOTE — PROGRESS NOTES
PHYSICAL THERAPY - DAILY TREATMENT NOTE Patient Name: Nigel Cruz        Date: 2018 : 1955   YES Patient  Verified Visit #:     Insurance: Payor: Reese Dailey / Plan: [facility] PPO / Product Type: PPO / In time: 3:36 Out time: 5:00 Total Treatment Time: 80 Medicare Time Tracking (below) Total Timed Codes (min): 73 1:1 Treatment Time:  40 TREATMENT AREA =  Right shoulder pain [M25.511] SUBJECTIVE Pain Level (on 0 to 10 scale): 0 / 10 Medication Changes/New allergies or changes in medical history, any new surgeries or procedures? NO    If yes, update Summary List  
Subjective Functional Status/Changes:  []  No changes reported Patient continues to report \"as long as I don't move it the wrong way, I'm okay. \" Also stated that she has difficulty putting weight through her RUE OBJECTIVE Modalities Rationale:     decrease inflammation and decrease pain  to improve patient's ability to perform pain free ADLs. min [] Estim, type/location:    
                                 []  att     []  unatt     []  w/US     []  w/ice    []  w/heat  
 min []  Mechanical Traction: type/lbs   
               []  pro   []  sup   []  int   []  cont    []  before manual    []  after manual  
 min []  Ultrasound, settings/location:    
 min []  Iontophoresis w/ dexamethasone, location:   
                                           []  take home patch       []  in clinic  
10 min [x]  Ice     []  Heat    location/position: Supine with bolster to R shoulder post-session  
 min []  Vasopneumatic Device, press/temp:   
 min []  Other:   
[x] Skin assessment post-treatment (if applicable):   
[x]  intact    []  redness- no adverse reaction    
[]redness  adverse reaction:     
 
30/63 min Therapeutic Exercise:  [x]  See flow sheet Rationale:      increase ROM, increase strength and improve coordination to improve the patients ability to perform [address] activities. 10 Min Manual Therapy: Gentle R shoulder PROM in all direction; gentle STM to R anterior delt, biceps, pec minor; manual pec minor stretch Rationale:      decrease pain, increase ROM, increase tissue extensibility and decrease trigger points to improve patient's ability to perform pain free ADLs. min Patient Education:  YES  Reviewed HEP []  Progressed/Changed HEP based on: Other Objective/Functional Measures: 
 
1:1 TE + MT = 40' Added shoulder isometrics, weight shifts, and scaption at the finger ladder Post Treatment Pain Level (on 0 to 10) scale:   0  / 10 ASSESSMENT Assessment/Changes in Function:  
 
Demonstrated good tolerance with progression of exercises; denies sharp pain during PT session, however notes \"stretching\" or \"ms activating\" discomfort. during progression of exercises. Continues to demonstrate IR ROM limitation during exercises. []  See Progress Note/Recertification Patient will continue to benefit from skilled PT services to modify and progress therapeutic interventions, address functional mobility deficits, address ROM deficits, address strength deficits, analyze and address soft tissue restrictions, analyze and cue movement patterns, analyze and modify body mechanics/ergonomics and assess and modify postural abnormalities to attain remaining goals. Progress toward goals / Updated goals: No significant progress towards LTGs PLAN [x]  Upgrade activities as tolerated YES Continue plan of care  
[]  Discharge due to :   
[]  Other:   
 
Therapist: Ulanda Bamberger, LPTA Date: 8/2/2018 Time: 6:08 PM  
 
Future Appointments Date Time Provider Dirk Paz 8/2/2018 3:30 PM [address] [facility]  
8/7/2018 4:00 PM Ulanda Bamberger [facility]  
8/9/2018 4:00 PM Ulanda Bamberger [facility]

## 2018-08-07 ENCOUNTER — APPOINTMENT (OUTPATIENT)
Dept: PHYSICAL THERAPY | Age: 63
End: 2018-08-07
Payer: COMMERCIAL

## 2018-08-09 ENCOUNTER — HOSPITAL ENCOUNTER (OUTPATIENT)
Dept: PHYSICAL THERAPY | Age: 63
Discharge: HOME | End: 2018-08-09
Payer: COMMERCIAL

## 2018-08-09 PROCEDURE — 97110 THERAPEUTIC EXERCISES: CPT

## 2018-08-09 NOTE — PROGRESS NOTES
[address], [address] [place] [address] - Phone: [phone]  Fax: [phone] SUMMARY  Patient Name: Jeanine Louise : 1955   Treatment/Medical Diagnosis: Right shoulder pain [M25.511]   Referral Source: Paula Layton MD     Date of Initial Visit: 18 Attended Visits: 12 Missed Visits: 1     SUMMARY OF TREATMENT  Therapeutic exercises, manual therapy, patient education, postural education, and home exercise program to improve R shoulder ROM, strength, flexibility, and endurance; modalities for pain control. CURRENT STATUS  Mrs. Zayda Sarmiento has made steady progress towards her PT goals for her R shoulder pain. Patient reports 50% overall improvement since beginning physical therapy; states pain at best 0/10 and pain a worst 5/10 when reaching behind her. Reports good compliance with home exercise program and participating in pool exercises with ice to reduce R shoulder pain. Demonstrates increase FOTO score to 58 (last PN: 57) and a +5 in global rating of change (GROC) denoting overall functional improvement. Patient plans on continuing with our post rehab program in conjunction with her current HEP and pool exercise regimen to maintain gains made from physical therapy. Goal/Measure of Progress Goal Met? 1. Decrease max pain 50-75% to assist with reaching behind   Status at last Eval: 8/10 Current Status: 4-5/10 yes   2. Increase FOTO score to 69% to show functional improvment   Status at last Eval: 57 Current Status: 58 Minimal progress   3. Increaese R IR behind the back ROM to <25cm to assist with ADLs. Status at last Eval: 38 cm from C7 Current Status: 32 cm from C7 Minimal progress     RECOMMENDATIONS  Discontinue therapy. Progressing towards or have reached established goals. If you have any questions/comments please contact us directly at [phone].    Thank you for allowing us to assist in the care of your patient.     Therapist Signature: VANE Dickson/Irineo Tobar, PT, OCS, SCS, CSCS Date: 10/3/18     Time: 4:54 PM

## 2018-08-09 NOTE — PROGRESS NOTES
PHYSICAL THERAPY - DAILY TREATMENT NOTE    Patient Name: Arlene Holly        Date: 2018  : 1955   YES Patient  Verified  Visit #:     Insurance: Payor: Kostas Lind / Plan: [facility] PPO / Product Type: PPO /      In time: 4:00 Out time: 4:45   Total Treatment Time: 45     Medicare Time Tracking (below)   Total Timed Codes (min): 35 1:1 Treatment Time:  25     TREATMENT AREA =  Right shoulder pain [M25.511]    SUBJECTIVE  Pain Level (on 0 to 10 scale): 0 / 10   Medication Changes/New allergies or changes in medical history, any new surgeries or procedures? NO    If yes, update Summary List   Subjective Functional Status/Changes:  []  No changes reported     SEE D/C       OBJECTIVE  Modalities Rationale:     decrease inflammation and decrease pain  to improve patient's ability to perform pain free ADLs. min [] Estim, type/location:                                      []  att     []  unatt     []  w/US     []  w/ice    []  w/heat    min []  Mechanical Traction: type/lbs                   []  pro   []  sup   []  int   []  cont    []  before manual    []  after manual    min []  Ultrasound, settings/location:      min []  Iontophoresis w/ dexamethasone, location:                                               []  take home patch       []  in clinic   10 min [x]  Ice     []  Heat    location/position: Supine with bolster to R shoulder post-session    min []  Vasopneumatic Device, press/temp:     min []  Other:    [x] Skin assessment post-treatment (if applicable):    [x]  intact    []  redness- no adverse reaction     []redness - adverse reaction:        25/35 min Therapeutic Exercise:  [x]  See flow sheet   Rationale:      increase ROM, increase strength and improve coordination to improve the patients ability to perform OH activities.       TC Min Manual Therapy: Gentle R shoulder PROM in all direction; gentle STM to R anterior delt, biceps, pec minor; manual pec minor stretch   Rationale:      decrease pain, increase ROM, increase tissue extensibility and decrease trigger points to improve patient's ability to perform pain free ADLs. min Patient Education:  YES  Reviewed HEP   []  Progressed/Changed HEP based on: Other Objective/Functional Measures: FOTO: 58/100 (1 point increase)  GROC: +5    SEE D/C     Post Treatment Pain Level (on 0 to 10) scale:   0  / 10     ASSESSMENT  Assessment/Changes in Function:     SEE D/C     []  See Progress Note/Recertification   Patient will continue to benefit from skilled PT services to modify and progress therapeutic interventions, address functional mobility deficits, address ROM deficits, address strength deficits, analyze and address soft tissue restrictions, analyze and cue movement patterns, analyze and modify body mechanics/ergonomics and assess and modify postural abnormalities to attain remaining goals.    Progress toward goals / Updated goals:    SEE D/C       PLAN  []  Upgrade activities as tolerated YES Continue plan of care   [x]  Discharge due to : Progressing towards all LTGs   []  Other:      Therapist: VANE Pacheco    Date: 8/9/2018 Time: 4:53 PM     Future Appointments  Date Time Provider Dirk Paz   8/9/2018 4:00 PM Halina Singer [facility]